# Patient Record
Sex: FEMALE | Race: BLACK OR AFRICAN AMERICAN | Employment: STUDENT | URBAN - METROPOLITAN AREA
[De-identification: names, ages, dates, MRNs, and addresses within clinical notes are randomized per-mention and may not be internally consistent; named-entity substitution may affect disease eponyms.]

---
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
## 2020-01-27 ENCOUNTER — HOSPITAL ENCOUNTER (EMERGENCY)
Facility: CLINIC | Age: 20
Discharge: HOME | End: 2020-01-27
Attending: PSYCHIATRY & NEUROLOGY | Admitting: PSYCHIATRY & NEUROLOGY
Payer: COMMERCIAL

## 2020-01-27 ENCOUNTER — TELEPHONE (OUTPATIENT)
Dept: BEHAVIORAL HEALTH | Facility: CLINIC | Age: 20
End: 2020-01-27

## 2020-01-27 VITALS
OXYGEN SATURATION: 98 % | BODY MASS INDEX: 44.01 KG/M2 | HEART RATE: 75 BPM | DIASTOLIC BLOOD PRESSURE: 78 MMHG | RESPIRATION RATE: 16 BRPM | WEIGHT: 252.4 LBS | TEMPERATURE: 98 F | SYSTOLIC BLOOD PRESSURE: 144 MMHG

## 2020-01-27 DIAGNOSIS — F31.30 BIPOLAR I DISORDER, MOST RECENT EPISODE DEPRESSED (H): ICD-10-CM

## 2020-01-27 PROCEDURE — 25000128 H RX IP 250 OP 636: Performed by: PSYCHIATRY & NEUROLOGY

## 2020-01-27 PROCEDURE — 99284 EMERGENCY DEPT VISIT MOD MDM: CPT | Mod: Z6 | Performed by: PSYCHIATRY & NEUROLOGY

## 2020-01-27 PROCEDURE — 99285 EMERGENCY DEPT VISIT HI MDM: CPT | Mod: 25 | Performed by: PSYCHIATRY & NEUROLOGY

## 2020-01-27 PROCEDURE — 25000132 ZZH RX MED GY IP 250 OP 250 PS 637: Performed by: PSYCHIATRY & NEUROLOGY

## 2020-01-27 PROCEDURE — 90791 PSYCH DIAGNOSTIC EVALUATION: CPT

## 2020-01-27 RX ORDER — LURASIDONE HYDROCHLORIDE 40 MG/1
40 TABLET, FILM COATED ORAL
Qty: 30 TABLET | Refills: 1 | Status: SHIPPED | OUTPATIENT
Start: 2020-01-27 | End: 2020-02-06

## 2020-01-27 RX ORDER — ONDANSETRON 4 MG/1
4 TABLET, ORALLY DISINTEGRATING ORAL ONCE
Status: COMPLETED | OUTPATIENT
Start: 2020-01-27 | End: 2020-01-27

## 2020-01-27 RX ADMIN — ACETAMINOPHINE, CAFFEINE 2 TABLET: 500; 65 TABLET, FILM COATED ORAL at 20:28

## 2020-01-27 RX ADMIN — ONDANSETRON 4 MG: 4 TABLET, ORALLY DISINTEGRATING ORAL at 20:06

## 2020-01-27 ASSESSMENT — ENCOUNTER SYMPTOMS
ABDOMINAL PAIN: 0
DYSPHORIC MOOD: 1
HEADACHES: 1
BACK PAIN: 0
SLEEP DISTURBANCE: 1
SHORTNESS OF BREATH: 0
HALLUCINATIONS: 0
CHEST TIGHTNESS: 0
FEVER: 0
DIZZINESS: 0

## 2020-01-27 NOTE — ED AVS SNAPSHOT
[facility], [place], Emergency Department  [address]  [facility] [address]  Phone:  [phone]  Fax:  [phone]                                    Nathaly Rodriguez   MRN: [id number]    Department:  [facility], Emergency Department   Date of Visit:  1/27/2020           After Visit Summary Signature Page    I have received my discharge instructions, and my questions have been answered. I have discussed any challenges I see with this plan with the nurse or doctor.    ..........................................................................................................................................  Patient/Patient Representative Signature      ..........................................................................................................................................  Patient Representative Print Name and Relationship to Patient    ..................................................               ................................................  Date                                   Time    ..........................................................................................................................................  Reviewed by Signature/Title    ...................................................              ..............................................  Date                                               Time          22EPIC Rev 08/18

## 2020-01-28 NOTE — TELEPHONE ENCOUNTER
S: Alka ACEVEDO called to provide collateral info on a 14 yr old female that is on route to [place] ED.   B: Pt called [facility] today because she is dealing with a lot of things.  She feels very depressed for awhile.  Pt is having a hard time sleeping, having low energy, feels overwhelmed and hopeless.  There is no active SI.  Pt has never attempted to harm self in the past.  Pt takes her prescribed medications on a regular basis; she is very cautious when she takes her meds.  Pt cried for 45 minutes before she was able to meet with the Psych.  Her regular therapist was out of the office this week.   A: On route to ED.  R: To be assessed and medically cleared in the ED.

## 2020-01-28 NOTE — ED PROVIDER NOTES
History     Chief Complaint   Patient presents with     Mental Health Problem     depressed and manic episodes, medications prescribed by her PMD are not helping her. Called [facility] today.      The history is provided by the patient and medical records.     Nathaly Rodriguez is a 19 year old female who comes in due to her worsening depression.  She has recently been diagnosed with bipolar disorder due to her hypomanic reaction to zoloft in the fall. She has been on latuda for a month at 20 mg. She thinks at times it may be working but mostly not.  She has been crying more, struggling to get out of bed, sleeping a lot and feeling very down.  She sometimes thinks she is going to die but has no suicidal or homicidal ideation.  She is going to school at [employer] and likes it.  She was working but quit due to the workload getting too big after several others quit before her.  She called [facility] today and they sent her in for a medication adjustment and possible other resources.     Please see the 's assessment in EPIC from today (1/27/20) for further details.    I have reviewed the Medications, Allergies, Past Medical and Surgical History, and Social History in the Epic system.    Review of Systems   Constitutional: Negative for fever.   Eyes: Negative for visual disturbance.   Respiratory: Negative for chest tightness and shortness of breath.    Cardiovascular: Negative for chest pain.   Gastrointestinal: Negative for abdominal pain.   Musculoskeletal: Negative for back pain.   Neurological: Positive for headaches. Negative for dizziness.   Psychiatric/Behavioral: Positive for dysphoric mood and sleep disturbance. Negative for hallucinations, self-injury and suicidal ideas.   All other systems reviewed and are negative.      Physical Exam   BP: (!) 144/78  Pulse: 75  Temp: 98  F (36.7  C)  Resp: 16  Weight: 114.5 kg (252 lb 6.4 oz)  SpO2: 98 %      Physical Exam  Vitals signs and nursing note reviewed.    Constitutional:       Appearance: Normal appearance. She is well-developed.   Cardiovascular:      Rate and Rhythm: Normal rate and regular rhythm.      Heart sounds: Normal heart sounds.   Pulmonary:      Effort: Pulmonary effort is normal. No respiratory distress.      Breath sounds: Normal breath sounds.   Neurological:      Mental Status: She is alert and oriented to person, place, and time.   Psychiatric:         Attention and Perception: Attention and perception normal.         Mood and Affect: Affect normal. Mood is depressed.         Speech: Speech normal.         Behavior: Behavior is withdrawn. Behavior is cooperative.         Thought Content: Thought content normal. Thought content is not paranoid or delusional. Thought content does not include homicidal or suicidal ideation. Thought content does not include homicidal or suicidal plan.         Cognition and Memory: Cognition and memory normal.         Judgment: Judgment normal.      Comments: Nathaly is a 18 y/o female who looks her age.  She is well groomed with good eye contact.          ED Course        Procedures               Labs Ordered and Resulted from Time of ED Arrival Up to the Time of Departure from the ED - No data to display         Assessments & Plan (with Medical Decision Making)   Nathaly will be discharged home.  She is not an imminent risk to herself or others.  She will increase her latuda to 40 mg.  She will follow up with her therapist.  She will be referred to [facility] treatment program and has an intake appointment on 1/29/20.  She also was set up with a mental health medication provider on 2/4/20.  Referrals were made to a few companies who arrange [facility] workers as well for further assistance.  Nathaly understands the plan and agrees.    I have reviewed the nursing notes.    I have reviewed the findings, diagnosis, plan and need for follow up with the patient.    New Prescriptions    No medications on file       Final diagnoses:    Bipolar I disorder, most recent episode depressed (H)       1/27/2020   [facility], [place], EMERGENCY DEPARTMENT     Bairon Chaparro MD  01/27/20 1954

## 2020-01-28 NOTE — DISCHARGE INSTRUCTIONS
Increase latuda to 40 mg once a day to help with your mood    Go to the intake for day treatment at [facility] scheduled for Wed 1/29/20 at 1 pm    Follow up with your individual therapist    Go to a new medication provider set up for Tuesday 2/4/20 at 3 pm    A referral to get an [facility] worker was made.  They will call you to help arrange this.

## 2020-02-05 ENCOUNTER — TELEPHONE (OUTPATIENT)
Dept: PEDIATRICS | Facility: CLINIC | Age: 20
End: 2020-02-05

## 2020-02-05 DIAGNOSIS — F31.30 BIPOLAR I DISORDER, MOST RECENT EPISODE DEPRESSED (H): ICD-10-CM

## 2020-02-05 NOTE — TELEPHONE ENCOUNTER
Reason for Call:  Other call back    Detailed comments: Latuda prescription issues.  Went to [facility] last week, provider gave her a paper to increase to 40.  Patient does not know doctor's name, nor does she have the paper.  She is running out and would like to get the prescription increased by Dr. Campbell.    Phone Number Patient can be reached at: Cell number on file:    Telephone Information:   Mobile [phone]       Best Time: any time    Can we leave a detailed message on this number? YES    Call taken on 2/5/2020 at 4:49 PM by Yahaira Rene

## 2020-02-06 RX ORDER — LURASIDONE HYDROCHLORIDE 40 MG/1
40 TABLET, FILM COATED ORAL
Qty: 30 TABLET | Refills: 1 | Status: SHIPPED | OUTPATIENT
Start: 2020-02-06 | End: 2020-02-19

## 2020-02-06 NOTE — TELEPHONE ENCOUNTER
Pharmacy pending.  Pt is doing good. She see's her therapist at [facility] every week in [place].  And still needs to call and schedule appts for:  --[facility]. Day treatment program. Missed appt that was scheduled on 1/29, so she needs to call and re-schedule that.  --[facility], to see psychiatry.

## 2020-02-06 NOTE — TELEPHONE ENCOUNTER
"\"Nathaly will be discharged home.  She is not an imminent risk to herself or others.  She will increase her latuda to 40 mg.  She will follow up with her therapist.  She will be referred to [facility] day treatment program and has an intake appointment on 1/29/20.  She also was set up with a mental health medication provider on 2/4/20.  Referrals were made to a few companies who arrange [facility] workers as well for further assistance.  Nathaly understands the plan and agrees.\" per ED notes.   I will refill, but please specify pharmacy    Muna Campbell MD on 2/6/2020 at 9:44 AM    "

## 2020-02-19 ENCOUNTER — OFFICE VISIT (OUTPATIENT)
Dept: PEDIATRICS | Facility: CLINIC | Age: 20
End: 2020-02-19
Payer: COMMERCIAL

## 2020-02-19 ENCOUNTER — TELEPHONE (OUTPATIENT)
Dept: PEDIATRICS | Facility: CLINIC | Age: 20
End: 2020-02-19

## 2020-02-19 ENCOUNTER — NURSE TRIAGE (OUTPATIENT)
Dept: PEDIATRICS | Facility: CLINIC | Age: 20
End: 2020-02-19

## 2020-02-19 VITALS
OXYGEN SATURATION: 100 % | SYSTOLIC BLOOD PRESSURE: 131 MMHG | TEMPERATURE: 98.1 F | HEART RATE: 92 BPM | RESPIRATION RATE: 16 BRPM | BODY MASS INDEX: 42.53 KG/M2 | WEIGHT: 243.9 LBS | DIASTOLIC BLOOD PRESSURE: 87 MMHG

## 2020-02-19 DIAGNOSIS — F31.30 BIPOLAR I DISORDER, MOST RECENT EPISODE DEPRESSED (H): Primary | ICD-10-CM

## 2020-02-19 DIAGNOSIS — G47.00 INSOMNIA, UNSPECIFIED TYPE: ICD-10-CM

## 2020-02-19 DIAGNOSIS — F30.8 HYPOMANIA (H): ICD-10-CM

## 2020-02-19 DIAGNOSIS — S66.912A STRAIN OF LEFT WRIST, INITIAL ENCOUNTER: ICD-10-CM

## 2020-02-19 PROCEDURE — 99215 OFFICE O/P EST HI 40 MIN: CPT | Performed by: PEDIATRICS

## 2020-02-19 PROCEDURE — 96127 BRIEF EMOTIONAL/BEHAV ASSMT: CPT | Performed by: PEDIATRICS

## 2020-02-19 RX ORDER — LURASIDONE HYDROCHLORIDE 20 MG/1
20 TABLET, FILM COATED ORAL
Qty: 30 TABLET | Refills: 0 | Status: SHIPPED | OUTPATIENT
Start: 2020-02-19 | End: 2020-02-20

## 2020-02-19 ASSESSMENT — ANXIETY QUESTIONNAIRES
5. BEING SO RESTLESS THAT IT IS HARD TO SIT STILL: NEARLY EVERY DAY
GAD7 TOTAL SCORE: 20
IF YOU CHECKED OFF ANY PROBLEMS ON THIS QUESTIONNAIRE, HOW DIFFICULT HAVE THESE PROBLEMS MADE IT FOR YOU TO DO YOUR WORK, TAKE CARE OF THINGS AT HOME, OR GET ALONG WITH OTHER PEOPLE: EXTREMELY DIFFICULT
3. WORRYING TOO MUCH ABOUT DIFFERENT THINGS: NEARLY EVERY DAY
1. FEELING NERVOUS, ANXIOUS, OR ON EDGE: NEARLY EVERY DAY
6. BECOMING EASILY ANNOYED OR IRRITABLE: MORE THAN HALF THE DAYS
2. NOT BEING ABLE TO STOP OR CONTROL WORRYING: NEARLY EVERY DAY
7. FEELING AFRAID AS IF SOMETHING AWFUL MIGHT HAPPEN: NEARLY EVERY DAY

## 2020-02-19 ASSESSMENT — PATIENT HEALTH QUESTIONNAIRE - PHQ9
SUM OF ALL RESPONSES TO PHQ QUESTIONS 1-9: 26
5. POOR APPETITE OR OVEREATING: NEARLY EVERY DAY

## 2020-02-19 NOTE — TELEPHONE ENCOUNTER
lurasidone (LATUDA) 40 MG TABS tablet--Pt called to tell Dr. Campbell that she is thinking about death and dying. Since increase in medication strength, she feels like she is going thru withdrawal--spiraling thought and tingling lasting for hours.     Please call pt [phone], ok to lv detailed message

## 2020-02-19 NOTE — TELEPHONE ENCOUNTER
Pt did not take medication today and is feeling ok now.  No thoughts of hurting self.  Made appt to be seen today with partner.  Advised to go to ER if anything changes.

## 2020-02-19 NOTE — TELEPHONE ENCOUNTER
LM on VM to call back.     Also left Crisis Line number to call as well as 911 if she is in immediate danger.     Will try again.    Isaura GERBERN, RN, PHN

## 2020-02-19 NOTE — PROGRESS NOTES
"Subjective    Nathaly Rodriguez is a 19 year old female who presents to clinic today with by herself because of:  Anxiety and Depression     HPI   Mental Health Follow-up Visit for Anxiety and depression    How is your mood today? Anxious and sad    Change in symptoms since last visit: worse    New symptoms since last visit:  Feeling restless and hard time calming her nerves    Problems taking medications: she feels the Latuda is not working.    Who else is on your mental health care team? Therapist and Primary Care Provider    +++++++++++++++++++++++++++++++++++++++++++++++++++++++++++++++    PHQ 10/22/2015 8/23/2019 2/19/2020   PHQ-9 Total Score 0 20 26   Q9: Thoughts of better off dead/self-harm past 2 weeks Not at all Not at all Nearly every day     BRITTA-7 SCORE 8/23/2019 2/19/2020   Total Score 20 (severe anxiety) -   Total Score 20 20     Nathaly has had mental health difficulties since graduating high school and starting college.  She was initially treated with Zoloft, but had a manic episode at work 3 months ago so that was discontinued.  She had been treated with Latuda.     Of note, she also has ADHD and takes Adderall  XR.  She also uses Atarax for anxiety    Initially, the 20 mg of Latuda seemed to help her.  After 3-4 weeks, she was again having \"spiraling thoughts of death\" and thinking of the works possible scenario of every situation.  She was concerned and called [facility].    They instructed her to go to the ED.  She was sen in the ED on 1/27/2020, deemed safe for discharge, and her Latuda was increased to 40 mg daily.      The increase in the medication has made her feel worse.  She took it in the morning, and around 2:30 pm felt the need to lay down for a couple of hours every day.  Then this need to lay down increased to all day long, until the medication wears off.  She feels like she cannot eat on the medication and has lost weight.  She has this \"shaking/intense tingle and overwhelming thoughts\" with it.  " She feels almost physically ill.  She cannot go to school.  She cannot go to work.     Today she did not take her medication.    Of note, she was smoking marijuna in the past but has stopped.  She tried CBD cigarettes but did not like them and has stopped.  Denies any other substance use.    A few months ago she woke up in the middle of the night and felt the need to clean her home, despite the fact that it was clean.  She dragged some kind of heavy box across a long hallway, and then it feell and hyperextended her wrist, which still hiurts.    Nathaly's birth parents both have been diagnosed with bipolar disorder.  She had been in foster care since the age of 10, and is having some trouble transitioning to adult living.  She does not feel like she has any adults in her life that can serve as a resource on how to structure her life and take care of herself.  She misses meals and does not sleep regularly.    Home and School     Have there been any big changes at home? Yes-  A few months ago she finished high school and started college    Are you having challenges at school?   No  Social Supports:     none.  Sleep:    Hours of sleep on a school night: </=7 hours (associated with increased risk of depression within 12 months)    Maladaptive coping strategies:    None desribed      Suicide Assessment Five-step Evaluation and Treatment (SAFE-T)        Review of Systems  Constitutional, eye, ENT, skin, respiratory, cardiac, and GI are normal except as otherwise noted.    Problem List  Patient Active Problem List    Diagnosis Date Noted     Bipolar I disorder, most recent episode depressed (H) 12/06/2019     Priority: Medium     Moderate episode of recurrent major depressive disorder (H) 08/23/2019     Priority: Medium     Eczema 02/20/2015     Priority: Medium     Attention deficit hyperactivity disorder (ADHD) 05/15/2014     Priority: Medium     Problem list name updated by automated process. Provider to review        Depression 05/15/2014     Priority: Medium     Seasonal allergic rhinitis 05/15/2014     Priority: Medium     Obesity 06/03/2013     Priority: Medium     Environmental allergies 06/03/2013     Priority: Medium      Medications  amphetamine-dextroamphetamine (ADDERALL XR) 10 MG 24 hr capsule, Take 1 capsule (10 mg) by mouth daily  amphetamine-dextroamphetamine (ADDERALL XR) 20 MG 24 hr capsule, Take 1 capsule (20 mg) by mouth daily  hydrOXYzine (ATARAX) 25 MG tablet, Take 1 tablet (25 mg) by mouth every 6 hours as needed for anxiety May take 50 mg at bedtime  loratadine (CLARITIN) 10 MG tablet, Take 10 mg by mouth    No current facility-administered medications on file prior to visit.     Allergies  Allergies   Allergen Reactions     Seasonal Allergies      Food Rash     Strawberries, Kiwi, Oranges, Pickles, Ronald & Limes, Tomatoes      Reviewed and updated as needed this visit by Provider  Meds  Problems           Objective    /87   Pulse 92   Temp 98.1  F (36.7  C) (Oral)   Resp 16   Wt 243 lb 14.4 oz (110.6 kg)   SpO2 100%   BMI 42.53 kg/m    >99 %ile based on CDC (Girls, 2-20 Years) weight-for-age data based on Weight recorded on 2/19/2020.    Physical Exam            Assessment & Plan    1. Bipolar I disorder, most recent episode depressed (H)  2. Hypomania (H)  Mixed manic and depressive episode  - lurasidone (LATUDA) 20 MG PO TABS tablet; Take 1 tablet (20 mg) by mouth daily with food  Dispense: 30 tablet; Refill: 0 - I asked her to decrease her dose to 20 mg for now.  - MENTAL HEALTH REFERRAL  - Adult; Psychiatry and Medication Management; Psychiatry; [facility] [facility] [phone].  Medication management & future refills will be returned to [facility] PCP upon completion of evaluation; We juanita...      After our visit I spoke with Dr. Seth Avalos of [facility] advice line.  He presents two management options.    1) [facility] intensive outpatient program.  We need to discuss  this option with the patient and ONLY if she is agreeable then call [facility] on the doctor referral line at [phone] to get her a place with Dr. Avalos's verbal referral.    2) Manage her ourselves:   - stop Adderall   - stop Latuda  - start Lithium 300 mg po bid.       Before starting it, get baseline kidney functions, thyroid levels, and an EKG     After 5 days on this, check a trough (morning before taking the morning dose) level - it should be around 0.6.  If that is the case then change dosing to 300mg qam and 600 mg pm.  Check an occasional trough level, it should be 0.9-1.  No need to check frequently unless she is having side effects - shakiness, diarrhea, other signs of toxicity.  - also, start Seroquel at bedtime at 50 mg po at bedtime.  This should help her sleep which will also help the manic symptoms.  May increase dose as needed unless sleep is adequate.    Will discuss plan with PCP and then reach out to patient.        3. Strain of left wrist, initial encounter  Patient reassured      Follow Up  Return in about 1 week (around 2/26/2020) for recheck, if not improving.      Daniela Cantu MD

## 2020-02-19 NOTE — TELEPHONE ENCOUNTER
Left message RE: Dr. Cantu's recommendations in today's note.   If she calls back Thursday or Friday please come get me out of a patient room so I can relay this information to her directly    Muna Campbell MD on 2/19/2020 at 4:13 PM  .

## 2020-02-20 ENCOUNTER — TELEPHONE (OUTPATIENT)
Dept: PEDIATRICS | Facility: CLINIC | Age: 20
End: 2020-02-20

## 2020-02-20 RX ORDER — QUETIAPINE FUMARATE 50 MG/1
50 TABLET, FILM COATED ORAL AT BEDTIME
Qty: 30 TABLET | Refills: 1 | Status: SHIPPED | OUTPATIENT
Start: 2020-02-20 | End: 2020-04-20

## 2020-02-20 ASSESSMENT — ANXIETY QUESTIONNAIRES: GAD7 TOTAL SCORE: 20

## 2020-02-20 NOTE — TELEPHONE ENCOUNTER
Pt advised, stated understanding, and agreed to plan of care.    Information printed. And faxed to [facility], [phone].

## 2020-02-20 NOTE — TELEPHONE ENCOUNTER
Reason for Call:  Other call back    Detailed comments: patient is calling back to get information regarding her treatment plan    Phone Number Patient can be reached at: Cell number on file:    Telephone Information:   Mobile [phone]       Best Time: Anytime    Can we leave a detailed message on this number? YES    Call taken on 2/20/2020 at 9:30 AM by Ortzi Gannon

## 2020-02-20 NOTE — TELEPHONE ENCOUNTER
1) [facility] intensive outpatient program. PATIENT IS AGREEABLE TO THIS OPTION .  [facility] the doctor referral line at [phone] to get her a place - Please fax them the last few clinic notes (printed) and a facesheet with contact and insurance infomartion with a mention that this is with Dr. Avalos's verbal referral as well.     Please let patient know when this has been done and let her know that [facility] will contact her either Friday or Monday with intake information. They will take care of sending any notes necessary to her school to excuse her from classes if necessary while she is getting intensive treatment. Patient advised it may take several weeks of this for her to get on the right track     2) patient was advised to:    - stop Adderall for now   - stop Latuda       - also, start Seroquel at bedtime at 50 mg po at bedtime (rx sent to pharmacy) .  This should help her sleep which will also help the manic symptoms.        Muna Campbell MD on 2/20/2020 at 1:03 PM

## 2020-02-27 ENCOUNTER — TELEPHONE (OUTPATIENT)
Dept: PEDIATRICS | Facility: CLINIC | Age: 20
End: 2020-02-27

## 2020-02-27 NOTE — TELEPHONE ENCOUNTER
Reason for Call:  Other call back    Detailed comments: Patient states she was told that [facility] would be calling her the 20th or 21st.  Patient has not heard from [facility] yet and would like the doctor to know this and would like to know what step she is to take next.    Phone Number Patient can be reached at: Cell number on file:    Telephone Information:   Mobile [phone]       Best Time: any time    Can we leave a detailed message on this number? Not known -- patient did not stay on line.    Call taken on 2/27/2020 at 2:53 PM by Yahaira Rene

## 2020-04-20 ENCOUNTER — TELEPHONE (OUTPATIENT)
Dept: PEDIATRICS | Facility: CLINIC | Age: 20
End: 2020-04-20

## 2020-04-20 ENCOUNTER — VIRTUAL VISIT (OUTPATIENT)
Dept: PEDIATRICS | Facility: CLINIC | Age: 20
End: 2020-04-20
Payer: COMMERCIAL

## 2020-04-20 DIAGNOSIS — F31.30 BIPOLAR I DISORDER, MOST RECENT EPISODE DEPRESSED (H): Primary | ICD-10-CM

## 2020-04-20 DIAGNOSIS — F31.30 BIPOLAR I DISORDER, MOST RECENT EPISODE DEPRESSED (H): ICD-10-CM

## 2020-04-20 DIAGNOSIS — G47.00 INSOMNIA, UNSPECIFIED TYPE: ICD-10-CM

## 2020-04-20 DIAGNOSIS — F30.8 HYPOMANIA (H): ICD-10-CM

## 2020-04-20 PROCEDURE — 99442 ZZC PHYSICIAN TELEPHONE EVALUATION 11-20 MIN: CPT | Performed by: PEDIATRICS

## 2020-04-20 RX ORDER — QUETIAPINE FUMARATE 50 MG/1
50 TABLET, FILM COATED ORAL AT BEDTIME
Qty: 30 TABLET | Refills: 1 | Status: SHIPPED | OUTPATIENT
Start: 2020-04-20 | End: 2020-06-16

## 2020-04-20 NOTE — NURSING NOTE
XIOMY emailed to pt at [email or web address] Ann Maloney on 4/20/2020 at 4:39 PM

## 2020-04-20 NOTE — TELEPHONE ENCOUNTER
Please call patient in 1 week to see how she is doing on her seroquel. Sleep improved? Side effects?   Discussed may need to increase dose and/or start on lithium if cannot get in with psychiatry promptly (encouraged  To do e-visit with them if cannot get in person appointment right now due to COVID). Has she contacted them yet?   Also placed care coordination encounter to assist/help make sure she gets follow-up regularly.   She is engaging in counseling per our last phone appointment    Muna Campbell MD on 4/20/2020 at 4:08 PM

## 2020-04-20 NOTE — PROGRESS NOTES
"Nathaly Rodriguez is a 20 year old female who is being evaluated via a billable telephone visit.      The patient has been notified of following:     \"This telephone visit will be conducted via a call between you and your physician/provider. We have found that certain health care needs can be provided without the need for a physical exam.  This service lets us provide the care you need with a short phone conversation.  If a prescription is necessary we can send it directly to your pharmacy.  If lab work is needed we can place an order for that and you can then stop by our lab to have the test done at a later time.    Telephone visits are billed at different rates depending on your insurance coverage. During this emergency period, for some insurers they may be billed the same as an in-person visit.  Please reach out to your insurance provider with any questions.    If during the course of the call the physician/provider feels a telephone visit is not appropriate, you will not be charged for this service.\"    Patient has given verbal consent for Telephone visit?  Yes    How would you like to obtain your AVS? E-Mail (inform patient AVS not encrypted)    Subjective     Nathaly Rodriguez is a 20 year old female who presents to clinic today for the following health issues:    Concerns: Pt is calling to discuss medications that were prescribed. The two medications were latuda and hydroxyzine however, pt had some reactions to that and [facility] told her to stop all medications (she has not seen psych yet). Pt was also told to stop adderall as well. So pt is wanting to discuss depression/anxiety, ADHD, and to help with sleep. There was mention of lithium?     The original plan was for patient to do day treatment at [facility] unfortunately we had her all set up and then she found out that there was a co-pay and insurance did not cover 100% she could not afford the co-pay and so did not proceed with that treatment  Has been off " all medications since February  Her grades have really taint she is completely unable to sleep  Denies any active suicidal ideation  May need a letter of appeal for school  Never picked up the Seroquel forgot all about it  Would like to try again to get some help    Patient Active Problem List   Diagnosis     Obesity     Environmental allergies     Attention deficit hyperactivity disorder (ADHD)     Depression     Seasonal allergic rhinitis     Eczema     Moderate episode of recurrent major depressive disorder (H)     Bipolar I disorder, most recent episode depressed (H)     History reviewed. No pertinent surgical history.    Social History     Tobacco Use     Smoking status: Never Smoker     Smokeless tobacco: Never Used   Substance Use Topics     Alcohol use: No     Alcohol/week: 0.0 standard drinks     Comment:   9/20/2013 11/01/2013 2/20/15     Family History   Problem Relation Age of Onset     Asthma Other         cousin     Cancer Maternal Grandmother         Breast cancer      Psychotic Disorder Mother         Depression      Asthma Other         cousin     C.A.D. No family hx of      Hypertension No family hx of      Lipids No family hx of      Cerebrovascular Disease No family hx of      Diabetes No family hx of      Cardiovascular No family hx of      Congenital Anomalies No family hx of      Blood Disease No family hx of      Endocrine Disease No family hx of      Gastrointestinal Disease No family hx of      Genitourinary Problems No family hx of      Respiratory No family hx of      Neurologic Disorder No family hx of          Current Outpatient Medications   Medication Sig Dispense Refill     QUEtiapine (SEROQUEL) 50 MG tablet Take 1 tablet (50 mg) by mouth At Bedtime 30 tablet 1     hydrOXYzine (ATARAX) 25 MG tablet Take 1 tablet (25 mg) by mouth every 6 hours as needed for anxiety May take 50 mg at bedtime (Patient not taking: Reported on 4/20/2020) 60 tablet 3     loratadine (CLARITIN) 10 MG  tablet Take 10 mg by mouth       Allergies   Allergen Reactions     Seasonal Allergies      Food Rash     Strawberries, Kiwi, Oranges, Pickles, Ronald & Limes, Tomatoes        Reviewed and updated as needed this visit by Provider  Tobacco  Allergies  Meds  Problems  Med Hx  Surg Hx  Fam Hx         Review of Systems   ROS COMP: GENERAL: No fever, weight change, fatigue  SKIN: No rash, hives, or significant lesions  HEENT: Hearing/vision: No Eye redness/discharge, nasal congestion, sneezing, snoring  RESP: No cough, wheezing, SOB  CV: No cyanosis, palpitations, syncope, chest pain  GI: No constipation, diarrhea, abdominal pain  Neuro: No headaches, tics, migraines, tremor  PSYCH: + history of depression or ODD, suicide attempts, cutting         Objective   Reported vitals:  There were no vitals taken for this visit.   healthy, alert and no distress  PSYCH: Alert and oriented times 3; coherent speech, normal   rate and volume, able to articulate logical thoughts, able   to abstract reason, no tangential thoughts, no hallucinations   or delusions  Her affect is normal does not sound as manic today as she did at the last visit  RESP: No cough, no audible wheezing, able to talk in full sentences  Remainder of exam unable to be completed due to telephone visits          Assessment/Plan:    ICD-10-CM    1. Bipolar I disorder, most recent episode depressed (H)  F31.30 QUEtiapine (SEROQUEL) 50 MG tablet     MENTAL HEALTH REFERRAL  - Adult; Psychiatry; Psychiatry; [facility]: Psychiatry Clinic [phone]; We will contact you to schedule the appointment or please call with any questions   2. Hypomania (H)  F30.8 QUEtiapine (SEROQUEL) 50 MG tablet     MENTAL HEALTH REFERRAL  - Adult; Psychiatry; Psychiatry; [facility]: Psychiatry Clinic [phone]; We will contact you to schedule the appointment or please call with any questions   3. Insomnia, unspecified type  G47.00 QUEtiapine (SEROQUEL) 50 MG tablet     MENTAL HEALTH  REFERRAL  - Adult; Psychiatry; Psychiatry; [facility]: Psychiatry Clinic [phone]; We will contact you to schedule the appointment or please call with any questions     Will re-leann to [facility] psychiatry as unable to get help through [facility]  F/u phone call in 1 week  May increase seroquel and may even need to start lithium- would need baseline labs, EKG before initiating Rx, talking with patient about this today and will reassess next week    Return in about 1 week (around 4/27/2020) for Depression/Anxiety follow-up.    Phone call duration:  15 minutes    Muna Campbell MD

## 2020-04-21 ENCOUNTER — PATIENT OUTREACH (OUTPATIENT)
Dept: CARE COORDINATION | Facility: CLINIC | Age: 20
End: 2020-04-21

## 2020-04-21 SDOH — ECONOMIC STABILITY: TRANSPORTATION INSECURITY
IN THE PAST 12 MONTHS, HAS THE LACK OF TRANSPORTATION KEPT YOU FROM MEDICAL APPOINTMENTS OR FROM GETTING MEDICATIONS?: NO

## 2020-04-21 SDOH — ECONOMIC STABILITY: INCOME INSECURITY: HOW HARD IS IT FOR YOU TO PAY FOR THE VERY BASICS LIKE FOOD, HOUSING, MEDICAL CARE, AND HEATING?: SOMEWHAT HARD

## 2020-04-21 SDOH — ECONOMIC STABILITY: FOOD INSECURITY: WITHIN THE PAST 12 MONTHS, THE FOOD YOU BOUGHT JUST DIDN'T LAST AND YOU DIDN'T HAVE MONEY TO GET MORE.: NEVER TRUE

## 2020-04-21 SDOH — ECONOMIC STABILITY: TRANSPORTATION INSECURITY
IN THE PAST 12 MONTHS, HAS LACK OF TRANSPORTATION KEPT YOU FROM MEETINGS, WORK, OR FROM GETTING THINGS NEEDED FOR DAILY LIVING?: NO

## 2020-04-21 SDOH — HEALTH STABILITY: MENTAL HEALTH
STRESS IS WHEN SOMEONE FEELS TENSE, NERVOUS, ANXIOUS, OR CAN'T SLEEP AT NIGHT BECAUSE THEIR MIND IS TROUBLED. HOW STRESSED ARE YOU?: RATHER MUCH

## 2020-04-21 SDOH — ECONOMIC STABILITY: FOOD INSECURITY: WITHIN THE PAST 12 MONTHS, YOU WORRIED THAT YOUR FOOD WOULD RUN OUT BEFORE YOU GOT MONEY TO BUY MORE.: NEVER TRUE

## 2020-04-21 ASSESSMENT — ACTIVITIES OF DAILY LIVING (ADL): DEPENDENT_IADLS:: INDEPENDENT

## 2020-04-21 NOTE — LETTER
[place] CARE COORDINATION  [facility]  [address] [address]  Tel. [phone]  Fax [phone]    April 21, 2020    Nathaly Zoejamesemily RODRIGUEZ  [facility] [address]      Dear Nathaly,    I am a clinic care coordinator who works with Muna Campbell MD at [facility]. I wanted to thank you for spending the time to talk with me.  Below is a description of clinic care coordination and how I can further assist you.      The clinic care coordination team is made up of a registered nurse,  and community health worker who understand the health care system. The goal of clinic care coordination is to help you manage your health and improve access to the health care system in the most efficient manner. The team can assist you in meeting your health care goals by providing education, coordinating services, strengthening the communication among your providers and supporting you with any resource needs.    Please feel free to contact me at [phone] with any questions or concerns. We are focused on providing you with the highest-quality healthcare experience possible and that all starts with you.     Sincerely,     WILLOW Rivera   Social Work Clinic Care Coordinator   [facility], and [facility]   PH: [phone]  [email or web address]   Enclosed: I have enclosed a copy of the Complex Care Plan. This has helpful information and goals that we have talked about. Please keep this in an easy to access place to use as needed.

## 2020-04-21 NOTE — PROGRESS NOTES
Clinic Care Coordination Contact    Clinic Care Coordination Contact  OUTREACH    Referral Information:  Referral Source: PCP    Primary Diagnosis: Psychosocial    Chief Complaint   Patient presents with     Clinic Care Coordination - Initial        Universal Utilization:   Clinic Utilization  Difficulty keeping appointments: No  Compliance Concerns: No  No-Show Concerns: No  No PCP office visit in Past Year: No  Utilization    Last refreshed: 4/21/2020 12:48 PM:  Hospital Admissions 0           Last refreshed: 4/21/2020 12:48 PM:  ED Visits 1           Last refreshed: 4/21/2020 12:48 PM:  No Show Count (past year) 2              Current as of: 4/21/2020 12:48 PM              Clinical Concerns:  Current Medical Concerns: None reported. Patient is diagnosed with:     Patient Active Problem List   Diagnosis     Obesity     Environmental allergies     Attention deficit hyperactivity disorder (ADHD)     Depression     Seasonal allergic rhinitis     Eczema     Moderate episode of recurrent major depressive disorder (H)     Bipolar I disorder, most recent episode depressed (H)         Current Behavioral Concerns: Patient struggles with anxiety and depression. She has been off all of her medications since February. PCP restarted patient on Seroquel yesterday 4/20/20. Patient accesses outpatient therapist and is looking to start psychiatry services. Her day treatment program at [facility] did not happen as she could not afford the copay/insurance did not cover 100% of the cost.     Education Provided to patient: role of YIFAN NOLASCO and clinic care coordination,  mental health resources, psychiatry referral order     YIFAN CC outreach to patient per PCP order. Discussed psychiatry order and gave phone number in case patient does not hear from them this week - [phone]. YIFAN NOLASCO also suggested patient reach out to  [facility] for  services - [phone].    Patient sees therapist at [facility]. YIFAN  updated care team in chart.     YIFAN  offered additional resources surrounding food, clothing, financials - declined.     Pain  Pain: None reported     Health Maintenance Reviewed: Due/Overdue - discussed with PCP at appointment 4/20/20    Health Maintenance Due   Topic Date Due     PREVENTIVE CARE VISIT  10/21/2017     INFLUENZA VACCINE (1) 09/01/2019       Clinical Pathway: Clinic Care Coordinator - Medications and Treatment  Patient is using individual therapy and medication(s) Seroquel and Atarax to treat depression and anxiety.  Benefits of medication were were not  discussed. Discussed with PCP 4/20/20.  Patient indicates good understanding of medication and need to continue to take, even if feeling better.    Use of caffeine, alcohol, and other mood-altering substances  were not  discussed.    Patient was not Referred to CD treatment.  Benefits of counseling  were  discussed.   Patient is is interested in seeing a counselor   Patient was assisted with mental health resources: psychiatrist, day treatment (if wanted).     Medication Management:  Medication reconciliation status: Medications reviewed and reconciled.  Continue medications without change.    Functional Status:  Dependent ADLs: Independent  Dependent IADLs: Independent  Bed or wheelchair confined: No  Mobility Status: Independent  Fallen 2 or more times in the past year?: No  Any fall with injury in the past year?: No    Living Situation:  Current living arrangement: I live in a private home  Type of residence: Private home with family     Lifestyle & Psychosocial Needs:  Lifestyle     Physical activity     Days per week: Not on file     Minutes per session: Not on file     Stress: Rather much     Social Needs     Financial resource strain: Somewhat hard     Food insecurity     Worry: Never true     Inability: Never true     Transportation needs     Medical: No     Non-medical: No     Diet: Regular  Inadequate nutrition: No  Tube  Feeding: No  Inadequate activity/exercise: No  Significant changes in sleep pattern: Yes  Transportation means: Regular car     Mormon or spiritual beliefs that impact treatment: No  Mental health DX: Yes  Mental health DX how managed: Medication, Outpatient Counseling  Mental health management concern: Yes  Informal Support system: Family     Socioeconomic History     Marital status: Single     Spouse name: Not on file     Number of children: Not on file     Years of education: Not on file     Highest education level: Not on file     Tobacco Use     Smoking status: Never Smoker     Smokeless tobacco: Never Used   Substance and Sexual Activity     Alcohol use: No     Alcohol/week: 0.0 standard drinks     Comment:   9/20/2013 11/01/2013 2/20/15     Drug use: Not Currently     Types: Marijuana     Comment:  9/20/2013 11/01/2013 2/20/15     Sexual activity: Not Currently     Partners: Male     Comment: 10/22/15  10/21/2016 l.n.        Resources and Interventions:  Current Resources:   Community Resources: OP Mental Health, School  Supplies used at home: None  Equipment Currently Used at Home: none    Advance Care Plan/Directive  Advanced Care Plans/Directives on file: No  Advanced Care Plan/Directive Status: Not Applicable    Referrals Placed: Mental Health, Outpatient Services, Behavioral Health Providers     Goals:   Goals        General    1. Mental Health Management (pt-stated)     Notes - Note created  4/21/2020  2:23 PM by Judy Garcia LSW    Goal Statement: I will establish with outpatient mental health providers within three months.   Date Goal set: 4/21/20  Barriers: Financial  Strengths: Motivated, has therapist now  Date to Achieve By: 8/1/20  Patient expressed understanding of goal: Yes    Action steps to achieve this goal:  1. I will continue working with my therapist.  2. I will schedule intake with [facility] mental health services for psychiatry.   3. I will call [facility] line for any  additional services I may need and work with YIFAN NOLASCO for ongoing support.              Patient/Caregiver understanding: Pt reports understanding and denies any additional questions or concerns at this times. YIFAN CC engaged in AIDET communication during encounter.    Outreach Frequency: Monthly    Plan: Patient was provided with this writer's contact information and encouraged to call with any questions or concerns. YIFAN CC will e-mail care coordination introduction letter and complex care plan to patient. Patient will connect with [facility] scheduling for psychiatry intake. Patient will continue to access her outpatient therapist. YIFAN NOLASCO will check in in two weeks to ensure patient scheduled with psychiatry.     WILLOW Rivera   Social Work Clinic Care Coordinator   [facility]   PH: [phone]  [email or web address]

## 2020-04-21 NOTE — LETTER
[facility]  Complex Care Plan  About Me:    Patient Name:  Nathaly Rodriguez    YOB: 2000  Age:         20 year old   [place] MRN:    [id number] Telephone Information:  Home Phone [phone]   Mobile [phone]       Address:  [address]  [facility] [address] Email address:  [email or web address]      Emergency Contact(s)    Name Relationship Lgl Grd Work Phone Home Phone Mobile Phone   1. PRATIBHA RIOJAS* Mother    [phone]   2. ZENY, MANOLO Relative   [phone]            Primary language:  English     needed? No   [facility]:  [phone] op. 1  Other communication barriers: None  Preferred Method of Communication:  John  Current living arrangement: I live in a private home  Mobility Status/ Medical Equipment: Independent    Health Maintenance  Health Maintenance Reviewed: Due/Overdue   Health Maintenance Due   Topic Date Due     PREVENTIVE CARE VISIT  10/21/2017     INFLUENZA VACCINE (1) 09/01/2019     My Access Plan  Medical Emergency 911   Primary Clinic Line [facility] - [phone]   24 Hour Appointment Line [phone] or  [phone] (toll-free)   24 Hour Nurse Line [phone] (toll-free)   Preferred Urgent Care [facility], [phone]   Preferred Hospital [facility]  [phone]   Preferred Pharmacy [id number] [address] AT [facility]     Behavioral Health Crisis Line The National Suicide Prevention Lifeline at 1-957.178.8029 or 911       My Care Team Members  Patient Care Team       Relationship Specialty Notifications Start End    Muna Campbell MD PCP - General Pediatrics All results, Admissions 3/6/14     Phone: [phone] Fax: [phone]         [address] [facility] [address]    Muna Campbell MD Assigned PCP    11/3/19     Phone: [phone] Fax: [phone]         [address] [place] [address]    Judy Garcia LSW Clinic Care Coordinator Primary Care - CC  4/21/20             My Care Plans  Self Management and Treatment Plan  Goals and (Comments)  Goals        General    1. Mental Health Management (pt-stated)     Notes - Note created  4/21/2020  2:23 PM by Judy Garcia LSW    Goal Statement: I will establish with outpatient mental health providers within three months.   Date Goal set: 4/21/20  Barriers: Financial  Strengths: Motivated, has therapist now  Date to Achieve By: 8/1/20  Patient expressed understanding of goal: Yes    Action steps to achieve this goal:  1. I will continue working with my therapist.  2. I will schedule intake with  mental health services for psychiatry.   3. I will call [facility]  line for any additional services I may need and work with [facility] for ongoing support.               Action Plans on File:                       Advance Care Plans/Directives Type:        My Medical and Care Information  Problem List   Patient Active Problem List   Diagnosis     Obesity     Environmental allergies     Attention deficit hyperactivity disorder (ADHD)     Depression     Seasonal allergic rhinitis     Eczema     Moderate episode of recurrent major depressive disorder (H)     Bipolar I disorder, most recent episode depressed (H)      Current Medications and Allergies:    Current Outpatient Medications   Medication     hydrOXYzine (ATARAX) 25 MG tablet     loratadine (CLARITIN) 10 MG tablet     QUEtiapine (SEROQUEL) 50 MG tablet     No current facility-administered medications for this visit.      Care Coordination Start Date: No linked episodes   Frequency of Care Coordination: monthly   Form Last Updated: 04/21/2020

## 2020-04-21 NOTE — PROGRESS NOTES
Clinic Care Coordination Contact  [facility]/Voicemail    Referral Source: PCP     Clinical Data: Care Coordinator Outreach  Mental health support     Outreach attempted x 1.  Left message on patient's voicemail with call back information and requested return call.    Plan: Care Coordinator will try to reach patient again in 1-2 business days.    WILLOW Rivera   Social Work Clinic Care Coordinator   [facility] and [facility]   PH: [phone]  [email or web address]

## 2020-04-27 NOTE — TELEPHONE ENCOUNTER
JUAN JOSE--Dr. Campbell,     Pt update.  Sleeping better on the seroquel 50mg before bed.  Also, taking hydroxyzone 25mg every morning for anxiety.    No appt with psychiatrty. Gave her phone # to call and schedule appt.  P: Psychiatry Clinic [phone]     Seeing counseling every Tues & Thurs. Tuesday's Virtual counseling via [facility] counselor.   Thursday's: [facility], [address] [place] [address].  Counseling & Mental Health  Phone: [phone]

## 2020-05-05 ENCOUNTER — PATIENT OUTREACH (OUTPATIENT)
Dept: CARE COORDINATION | Facility: CLINIC | Age: 20
End: 2020-05-05

## 2020-05-05 NOTE — PROGRESS NOTES
Clinic Care Coordination Contact  [facility]/Voicemail    Clinical Data: Care Coordinator Outreach  Check in to ensure psychiatry appt made/medication check in     Outreach attempted x 1.  Left message on patient's voicemail with call back information and requested return call.    Chart review indicates nurse called patient on 4/27/20 for an update. Patient reported she is sleeping better on Seroquel 50 mg before bed. Patient is also taking hydroxyzine 25 mg every morning for anxiety. Patient had not made psychiatry appt at that time, was given contact information for [facility] again. ([phone]). Confirmed she was seeing her counselor via virtual visits.     Plan: Care Coordinator will try to reach patient again in 10 business days.    WILLWO Rivera   Social Work Clinic Care Coordinator   [facility]   PH: [phone]  [email or web address]

## 2020-05-13 ENCOUNTER — TELEPHONE (OUTPATIENT)
Dept: PSYCHIATRY | Facility: CLINIC | Age: 20
End: 2020-05-13

## 2020-05-13 NOTE — TELEPHONE ENCOUNTER
PSYCHIATRY CLINIC PHONE INTAKE     SERVICES REQUESTED / INTERESTED IN          Med Management    Presenting Problem and Brief History                              What would you like to be seen for? (brief description):  Pt has concerns about insomnia. Her mind races and she can't sleep even if she's tired. Sometimes she gets really sad, it can be about a thought from the past, or high stressed environments. She freezes in those moments. It feels like anxiety. She's been trying to get her license but her anxiety gets so high that she can't complete her test. She takes quetiapine 50mg, hydroxyzine 25mg, and adderall 20mg and 10mg. Pt's meds are being prescribed by PCP. Her PCP wants her medications reviewed to make sure she's on the right medications. Pt wants to get an eval first and see what recommendations she gets.     Have you received a mental health diagnosis? No   Which one (s): NA  Is there any history of developmental delay?  No   Are you currently seeing a mental health provider?  Yes            Who / month last seen:  Monae Abdalla, Therapist at [facility] in [place].   Do you have mental health records elsewhere?  Yes  Will you sign a release so we can obtain them?  Yes    Have you ever been hospitalized for psychiatric reasons?  Yes  Describe:  When she was 14 she was hospitalized for 2 weeks.     Do you have current thoughts of self-harm?  No    Do you currently have thoughts of harming others?  No       Substance Use History     Do you have any history of alcohol / illicit drug use?  No  Describe:  NA  Have you ever received treatment for this?  No    Describe:  NA     Social History     Who is the patient's a guardian?  No    Name / number: NA  Have you had an ACT team in last 12 months?  No  Describe: NA   Do you have any current or past legal issues?  No  Describe: NA   OK to leave a detailed voicemail?  Yes    Medical/ Surgical History                                   Patient Active Problem  List   Diagnosis     Obesity     Environmental allergies     Attention deficit hyperactivity disorder (ADHD)     Depression     Seasonal allergic rhinitis     Eczema     Moderate episode of recurrent major depressive disorder (H)     Bipolar I disorder, most recent episode depressed (H)          Medications             Current Outpatient Medications   Medication Sig Dispense Refill     hydrOXYzine (ATARAX) 25 MG tablet Take 1 tablet (25 mg) by mouth every 6 hours as needed for anxiety May take 50 mg at bedtime (Patient not taking: Reported on 4/20/2020) 60 tablet 3     loratadine (CLARITIN) 10 MG tablet Take 10 mg by mouth       QUEtiapine (SEROQUEL) 50 MG tablet Take 1 tablet (50 mg) by mouth At Bedtime 30 tablet 1         DISPOSITION      5/13/20 Intake complete. Adding to resident/NP PIPER.   Francisca Martinez,

## 2020-05-19 NOTE — PROGRESS NOTES
"Clinic Care Coordination Contact    Follow Up Progress Note      Assessment: Patient is enrolled in ongoing [facility] for support and coaching in accessing mental health services.     Goals addressed this encounter:   Goals Addressed                 This Visit's Progress      1. Mental Health Management (pt-stated)   50%     Goal Statement: I will establish with outpatient mental health providers within three months.   Date Goal set: 4/21/20  Barriers: Financial  Strengths: Motivated, has therapist now  Date to Achieve By: 8/1/20  Patient expressed understanding of goal: Yes    Action steps to achieve this goal:  1. I will continue working with my therapist.  2. I will schedule intake with  mental health services for psychiatry.   3. I will call [facility]  line for any additional services I may need and work with YIFAN CC for ongoing support.      As of today's date 5/19/2020 goal is met at 26 - 50%.   Goal Status:  Active- had [facility] psych intake, need appt              Intervention/Education provided during outreach: Goal, [facility] mental health resources/contact info     YIFAN CC outreach to patient to check in. Patient reported she completed her [facility] psych intake, SW CC confirmed via chart review. The staff person told her it may be up to a 5-6 month wait for psychiatry. Patient was told to \"ask Dr for anything sooner.\" SW CC advised patient to call  one access mental health line ([phone]). The staff there may have a way to refer outside of [facility] if needed. Otherwise, call YIFAN CC back for other options. Patient agreeable to plan.      Outreach Frequency: Monthly    Plan: SW CC will follow up in two weeks for check in on status of psych appt. Patient will call  one access mental health line to see about earlier appts/outside [facility] resources.     WILLOW Rivera   Social Work Clinic Care Coordinator   [facility]   PH: [phone]  [email or web address]   "

## 2020-06-02 ENCOUNTER — MYC MEDICAL ADVICE (OUTPATIENT)
Dept: CARE COORDINATION | Facility: CLINIC | Age: 20
End: 2020-06-02

## 2020-06-02 ENCOUNTER — PATIENT OUTREACH (OUTPATIENT)
Dept: CARE COORDINATION | Facility: CLINIC | Age: 20
End: 2020-06-02

## 2020-06-02 NOTE — PROGRESS NOTES
Clinic Care Coordination Contact    Follow Up Progress Note      Assessment: Patient is enrolled in ongoing [facility] for support and coaching in accessing mental health services.     Goals addressed this encounter:   Goals Addressed                 This Visit's Progress      1. Mental Health Management (pt-stated)   50%     Goal Statement: I will establish with outpatient mental health providers within three months.   Date Goal set: 4/21/20  Barriers: Financial  Strengths: Motivated, has therapist now  Date to Achieve By: 8/1/20  Patient expressed understanding of goal: Yes    Action steps to achieve this goal:  1. I will continue working with my therapist.  2. I will schedule intake with  mental health services for psychiatry.   3. I will call [facility] line for any additional services I may need and work with SW CC for ongoing support.      As of today's date 5/19/2020 goal is met at 26 - 50%.   Goal Status:  Active- had [facility] psych intake, need appt   As of today's date 6/2/2020 goal is met at 26 - 50%.   Goal Status:  Active- had [facility] psych intake, need appt              Intervention/Education provided during outreach: SW CC outreach to patient for check in. SW CC inquired if patient has any update on scheduling a psychiatry appt. Patient has not scheduled and has not reached out ot [facility] one access line yet since last SW CC call.     Patient recently activated [employer].  CC will send psychiatry options to patient via [employer] to review as well.      Outreach Frequency: Monthly    Care Coordinator has reviewed patient's Social Determinants of Health (SDoH) on this date. Upon review, changes were not made. SW CC sent patient SDoH questionnaire via [employer] to update.     Plan: YIFAN CC will follow up in one month for goal check in. Patient will schedule psychiatry appt ASAP.     WILLOW Rivera   Social Work Clinic Care Coordinator   [facility]   PH: [phone]   [email or web address]

## 2020-06-02 NOTE — LETTER
Psychiatry options:     - [facility] access for mental health: [phone]   o Ask for outside [facility] providers if they have sooner appt options     [email or web address]   - [place]: [address], [address], [place] [address]  o Phone: [phone]  - [place]: [address], [address], [place] [address]  o Phone: [phone]  - [place]: [address], [address], [place] [address]  o Phone: [phone]    [email or web address]   - [address] [place] [address]  - Phone: [phone]  - Other locations: [email or web address]     [email or web address]  - [address], [address], [place] [address]  - Phone: [phone]

## 2020-06-09 ENCOUNTER — TELEPHONE (OUTPATIENT)
Dept: SCHEDULING | Facility: CLINIC | Age: 20
End: 2020-06-09

## 2020-06-09 SDOH — ECONOMIC STABILITY: INCOME INSECURITY: IN THE LAST 12 MONTHS, WAS THERE A TIME WHEN YOU WERE NOT ABLE TO PAY THE MORTGAGE OR RENT ON TIME?: YES

## 2020-06-09 NOTE — LETTER
[facility]  [address] [address]  Tel. [phone]  Fax [phone]    June 9, 2020    Nathaly Rodriguez  2000  [address] [facility] [address]      To Whom it May Concern:    Nathaly Rodriguez has been struggling with mental health problems for several years and  We have had to adjust her medications several times to try to help her as best we can.  Patients unfortunately often experience medication side effects or need to try several medications  And/or doses before the correct combination is found.   Counseling has been helpful but takes time to work.     I believe that Nathaly's schooling was affected by medication side effects as well as her underlying   Mental health conditions and not having her on the right combination/dose contributed to her   Not being able to complete her schoolwork/semester.     Adjustments have been made for the better. She is working hard to be well.     Please consider this appeal and allow her another chance at success.     For questions or concerns call the [facility] at [phone] (Peds).    Sincerely,        Muna Campbell MD

## 2020-06-09 NOTE — TELEPHONE ENCOUNTER
Reason for call:  Other   Patient called regarding (reason for call): call back    Additional comments: Patient requesting note from provider Shannan stating that patient was on the incorrect dosage of medications (per patient dosages were changed by another provider) and it affected her taking a class at school.    Patient is writing an appeal in regards to her class and is hoping to have this information for the appeal.     Per patient, medications were: Latuda, Zoloft, Adderall, and Hydroxyzine.    Phone number to reach patient:  Home number on file [phone] (home)    Best Time:  Any time    Can we leave a detailed message on this number?  YES    Travel screening: Not Applicable

## 2020-06-09 NOTE — TELEPHONE ENCOUNTER
Letter written> would be happy to edit if parents would like me to review any of the phrasing    Muna Campbell MD on 6/9/2020 at 4:58 PM

## 2020-06-15 DIAGNOSIS — G47.00 INSOMNIA, UNSPECIFIED TYPE: ICD-10-CM

## 2020-06-15 DIAGNOSIS — F30.8 HYPOMANIA (H): ICD-10-CM

## 2020-06-15 DIAGNOSIS — F31.30 BIPOLAR I DISORDER, MOST RECENT EPISODE DEPRESSED (H): ICD-10-CM

## 2020-06-16 RX ORDER — QUETIAPINE FUMARATE 50 MG/1
50 TABLET, FILM COATED ORAL AT BEDTIME
Qty: 30 TABLET | Refills: 0 | Status: SHIPPED | OUTPATIENT
Start: 2020-06-16 | End: 2020-09-29

## 2020-06-16 NOTE — TELEPHONE ENCOUNTER
Sagrario refill provided for one month - [facility] in [place] ( I had to guess the pharmacy).  Patient is due for medication follow up, please call to schedule follow up prior to next refill.  Video visit ok.  I know she has been working on getting with psychiatry, and that is still what we prefer, but if she cannot get in with thtem she should follow up with us.    Electronically signed by:  Daniela Cantu MD  Pediatrics  [facility]

## 2020-07-01 ENCOUNTER — PATIENT OUTREACH (OUTPATIENT)
Dept: CARE COORDINATION | Facility: CLINIC | Age: 20
End: 2020-07-01

## 2020-07-01 NOTE — LETTER
[place] CARE COORDINATION  [facility]  [address] [address]  Tel. [phone]  Fax [phone]    July 17, 2020    Nathaly Rodriguez  [address]  [facility] [address]      Dear Nathaly,    I have been unsuccessful in reaching you since our last contact. At this time the Care Coordination team will make no further attempts to reach you, however this does not change your ability to continue receiving care from your providers at your primary care clinic. If you need additional support from a care coordinator in the future please contact me at [phone].    All of us at [facility] are invested in your health and are here to assist you in meeting your goals.     Sincerely,    WILLOW Rivera   Social Work Clinic Care Coordinator   [facility]   PH: [phone]  [email or web address]

## 2020-07-01 NOTE — PROGRESS NOTES
Clinic Care Coordination Contact  [facility]/Voicemail    Clinical Data: Care Coordinator Outreach    Outreach attempted x 1.  Left message on patient's voicemail with call back information and requested return call. Inquired it pt has psychiatrist yet or if she needs help scheduling PCP appt due to recent gurdeep refill/need for appt for any more refills from .     Plan: Care Coordinator will try to reach patient again in 10 business days.    WILLOW Rivera   Social Work Clinic Care Coordinator   [facility]   PH: [phone]  [email or web address]

## 2020-07-17 ENCOUNTER — MYC MEDICAL ADVICE (OUTPATIENT)
Dept: CARE COORDINATION | Facility: CLINIC | Age: 20
End: 2020-07-17

## 2020-07-17 NOTE — PROGRESS NOTES
Clinic Care Coordination Contact  [facility]/Voicemail    Clinical Data: Care Coordinator Outreach    Outreach attempted x 2.  Left message on patient's voicemail with call back information and requested return call.    Pt dis-enrolled from [facility] due to no contact.     Plan: Care Coordinator will send unable to contact letter with care coordinator contact information via [employer]. Care Coordinator will do no further outreaches at this time.    WILLOW Rivera   Social Work Clinic Care Coordinator   [facility]   PH: [phone]  [email or web address]

## 2020-09-28 ENCOUNTER — TELEPHONE (OUTPATIENT)
Dept: PEDIATRICS | Facility: CLINIC | Age: 20
End: 2020-09-28

## 2020-09-28 DIAGNOSIS — F31.30 BIPOLAR I DISORDER, MOST RECENT EPISODE DEPRESSED (H): ICD-10-CM

## 2020-09-28 DIAGNOSIS — F30.8 HYPOMANIA (H): ICD-10-CM

## 2020-09-28 DIAGNOSIS — G47.00 INSOMNIA, UNSPECIFIED TYPE: ICD-10-CM

## 2020-09-29 RX ORDER — QUETIAPINE FUMARATE 50 MG/1
50 TABLET, FILM COATED ORAL AT BEDTIME
Qty: 30 TABLET | Refills: 0 | Status: SHIPPED | OUTPATIENT
Start: 2020-09-29 | End: 2020-10-12

## 2020-09-29 NOTE — TELEPHONE ENCOUNTER
"VV scheduled with Dr. Campbell on: 10/12/2020 2:20 PM .    Spoke to pt, she says she has not seen psychiatry, she says she contacted them in the summer and there were \"all these waitlists.\"  Advised her to call the [facility], and at least try to get scheduled/on the books - even if it maybe months in advance. She has the # and says she will call to schedule.      \" one access for mental health: [phone]   ? Ask for soonest [facility] appt or they can connect you to outside  providers if they have sooner appt options.\"  " [facility] called looking for verbal orders for skilled nursing for about 9 weeks. 2 times next week and 1 time per week for 7 weeks.    Candi with [facility] can be reached at the following number:  [phone]

## 2020-09-29 NOTE — TELEPHONE ENCOUNTER
Not sure what happened to 8/17 message ? Never routed????    I provided one gurdeep refill on seroquel but patient absolutely needs f/u appointment, 40 minutes- can be virtual- has she been able to contact psychiatry? See July outreach messages      Muna Campbell MD on 9/29/2020 at 12:46 PM

## 2020-10-12 ENCOUNTER — TELEPHONE (OUTPATIENT)
Dept: PEDIATRICS | Facility: CLINIC | Age: 20
End: 2020-10-12

## 2020-10-12 ENCOUNTER — VIRTUAL VISIT (OUTPATIENT)
Dept: PEDIATRICS | Facility: CLINIC | Age: 20
End: 2020-10-12
Payer: COMMERCIAL

## 2020-10-12 DIAGNOSIS — F31.30 BIPOLAR I DISORDER, MOST RECENT EPISODE DEPRESSED (H): ICD-10-CM

## 2020-10-12 DIAGNOSIS — F31.30 BIPOLAR I DISORDER, MOST RECENT EPISODE DEPRESSED (H): Primary | ICD-10-CM

## 2020-10-12 DIAGNOSIS — F30.8 HYPOMANIA (H): ICD-10-CM

## 2020-10-12 DIAGNOSIS — G47.00 INSOMNIA, UNSPECIFIED TYPE: ICD-10-CM

## 2020-10-12 DIAGNOSIS — E66.01 MORBID OBESITY (H): ICD-10-CM

## 2020-10-12 PROCEDURE — 99214 OFFICE O/P EST MOD 30 MIN: CPT | Mod: 95 | Performed by: PEDIATRICS

## 2020-10-12 RX ORDER — QUETIAPINE FUMARATE 50 MG/1
50 TABLET, FILM COATED ORAL AT BEDTIME
Qty: 90 TABLET | Refills: 1 | Status: SHIPPED | OUTPATIENT
Start: 2020-10-12 | End: 2020-10-12

## 2020-10-12 RX ORDER — QUETIAPINE FUMARATE 50 MG/1
50 TABLET, EXTENDED RELEASE ORAL AT BEDTIME
Qty: 30 TABLET | Refills: 0 | Status: SHIPPED | OUTPATIENT
Start: 2020-10-12 | End: 2020-12-03

## 2020-10-12 RX ORDER — QUETIAPINE FUMARATE 100 MG/1
100 TABLET, FILM COATED ORAL AT BEDTIME
Qty: 30 TABLET | Refills: 3 | Status: SHIPPED | OUTPATIENT
Start: 2020-10-12 | End: 2020-11-17

## 2020-10-12 ASSESSMENT — ANXIETY QUESTIONNAIRES
GAD7 TOTAL SCORE: 18
3. WORRYING TOO MUCH ABOUT DIFFERENT THINGS: NEARLY EVERY DAY
IF YOU CHECKED OFF ANY PROBLEMS ON THIS QUESTIONNAIRE, HOW DIFFICULT HAVE THESE PROBLEMS MADE IT FOR YOU TO DO YOUR WORK, TAKE CARE OF THINGS AT HOME, OR GET ALONG WITH OTHER PEOPLE: EXTREMELY DIFFICULT
5. BEING SO RESTLESS THAT IT IS HARD TO SIT STILL: SEVERAL DAYS
1. FEELING NERVOUS, ANXIOUS, OR ON EDGE: NEARLY EVERY DAY
7. FEELING AFRAID AS IF SOMETHING AWFUL MIGHT HAPPEN: NEARLY EVERY DAY
2. NOT BEING ABLE TO STOP OR CONTROL WORRYING: NEARLY EVERY DAY
6. BECOMING EASILY ANNOYED OR IRRITABLE: NEARLY EVERY DAY

## 2020-10-12 ASSESSMENT — PATIENT HEALTH QUESTIONNAIRE - PHQ9
5. POOR APPETITE OR OVEREATING: MORE THAN HALF THE DAYS
SUM OF ALL RESPONSES TO PHQ QUESTIONS 1-9: 14

## 2020-10-12 NOTE — TELEPHONE ENCOUNTER
See today's office visit for background. Discussed patient's case with psychiatry hotline.  Reviewed most pressing concerns and med history. Given that patient has found some relief with 50 mg of seroquel short acting, Dr. Sinha recommended increasing that to 100 mg of short -acting at bedtime and adding long-acting seroquel as well starting at 50 mg daily and consider increasing the long-acting med gradually to 150 mg .    Called patient with this recommendation. She is willing to try it. Will contact us if any concerns. She was excited because she was able to secure a psych intake appointment for later late October/Early November      Muna Campbell MD on 10/12/2020 at 5:16 PM

## 2020-10-12 NOTE — PROGRESS NOTES
"Nathaly Rodriguez is a 20 year old female who is being evaluated via a billable video visit.      The patient has been notified of following:     \"This video visit will be conducted via a call between you and your physician/provider. We have found that certain health care needs can be provided without the need for an in-person physical exam.  This service lets us provide the care you need with a video conversation.  If a prescription is necessary we can send it directly to your pharmacy.  If lab work is needed we can place an order for that and you can then stop by our lab to have the test done at a later time.    Video visits are billed at different rates depending on your insurance coverage.  Please reach out to your insurance provider with any questions.    If during the course of the call the physician/provider feels a video visit is not appropriate, you will not be charged for this service.\"    Patient has given verbal consent for Video visit? Yes  How would you like to obtain your AVS? MyChart  If you are dropped from the video visit, the video invite should be resent to: Text to cell phone: [phone]  Will anyone else be joining your video visit? No    Subjective     Nathaly Rodriguez is a 20 year old female who presents today via video visit for the following health issues:    HPI       Mental Health Follow-up Visit for Depression and anxiety    How is your mood today? good    Change in symptoms since last visit: same    New symptoms since last visit:  none    Problems taking medications: No    Who else is on your mental health care team? 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 5-6 days a week  Because her thoughts won't let her rest  Not always sleepy anymore  Sometimes almost dozes off at work but UP when she gets home    overanalysis and constant selfdoubt have her friendship  I don't know how to stop the things I don't want to do  Dredging up old bad thoughts  Fighting herself constantly    Can't ever just be relaxed even watching TV  Like a different movie is playing in her head  Can't follow the story because her mind is on a different track  Accidentally slows down when driving because she's distracted     Depression Response    Patient completed the PHQ-9 assessment for depression and scored >9? Yes  Question 9 on the PHQ-9 was positive for suicidality? No  Does patient have current mental health provider? Yes    Is this a virtual visit? Yes   Does patient have suicidal ideation (positive question 9)? No - offer to place Mental Health Referral.  Referral order placed       CALL [facility] HOTLINE UNTIL SHE CAN GET IN TO PSYCHIATRY     In the past two weeks have you had thoughts of suicide or self-harm?  No.    Do you have concerns about your personal safety or the safety of others?   No    Home and School     Have there been any big changes at home? No    Are you having challenges at school?   No  Social Supports:     Friend(s) a friend , her cats , her therapist  Sleep:    Hours of sleep on a school night: </=7 hours (associated with increased risk of depression within 12 months)  Substance abuse:    None  Maladaptive coping strategies:    None  Other  Stressors: WAS ABLE TO GET HER LICENSE    Suicide Assessment Five-step Evaluation and Treatment (SAFE-T)       Video Start Time: 02:59 PM      Review of Systems   Constitutional, HEENT, cardiovascular, pulmonary, GI, , musculoskeletal, neuro, skin, endocrine and psych systems are negative, except as otherwise noted.      Objective           Vitals:  No vitals were obtained today due to virtual visit.    Physical Exam     GENERAL: Healthy, alert and no distress  EYES: Eyes grossly normal to inspection.  No discharge or erythema, or obvious scleral/conjunctival abnormalities.  RESP: No audible wheeze, cough, or visible cyanosis.  No visible retractions or increased work of breathing.    SKIN: Visible skin clear. No significant rash, abnormal pigmentation or lesions.  NEURO: Cranial nerves grossly intact.  Mentation and speech appropriate for age.  PSYCH: Mentation appears normal, affect normal/bright, judgement and insight intact, normal speech and appearance well-groomed.              ICD-10-CM    1. Bipolar I disorder, most recent episode depressed (H)  F31.30 QUEtiapine (SEROQUEL) 50 MG tablet     MENTAL HEALTH REFERRAL  - Adult; Psychiatry; Psychiatry; [facility]: Psychiatry Clinic [phone]; We will contact you to schedule the appointment or please call with any questions   2. Insomnia, unspecified type  G47.00 QUEtiapine (SEROQUEL) 50 MG tablet     MENTAL HEALTH REFERRAL  - Adult; Psychiatry; Psychiatry; [facility]: Psychiatry Clinic [phone]; We will contact you to schedule the appointment or please call with any questions   3. Hypomania (H)  F30.8 QUEtiapine (SEROQUEL) 50 MG tablet     MENTAL HEALTH REFERRAL  - Adult; Psychiatry; Psychiatry; [facility]: Psychiatry Clinic [phone]; We will contact you to schedule the appointment or please call with any questions   4. Morbid obesity (H)  E66.01      Will contact [facility] hotline to see what med adjustments we can do for her before she can get see in to adult psychiatry  in person. Unable to be seen last spring due to insurance problems- now has Val [facility]    See associated telephone call for additional time documentation for PAL consult.   5 p.m. appointment today      Video-Visit Details    Type of service:  Video Visit    Video End Time:3:21 PM    Originating Location (pt. Location): Home    Distant Location (provider location):  [facility]     Platform used for Video Visit: Larry Campbell MD on 10/12/2020 at 3:40 PM

## 2020-10-13 ASSESSMENT — ANXIETY QUESTIONNAIRES: GAD7 TOTAL SCORE: 18

## 2020-10-28 ENCOUNTER — VIRTUAL VISIT (OUTPATIENT)
Dept: PSYCHIATRY | Facility: CLINIC | Age: 20
End: 2020-10-28
Attending: PSYCHOLOGIST
Payer: COMMERCIAL

## 2020-10-28 DIAGNOSIS — F34.1 PERSISTENT DEPRESSIVE DISORDER: ICD-10-CM

## 2020-10-28 DIAGNOSIS — F12.20 SEVERE CANNABIS USE DISORDER (H): ICD-10-CM

## 2020-10-28 DIAGNOSIS — F43.10 PTSD (POST-TRAUMATIC STRESS DISORDER): Primary | ICD-10-CM

## 2020-10-28 DIAGNOSIS — F41.1 GAD (GENERALIZED ANXIETY DISORDER): ICD-10-CM

## 2020-10-28 PROCEDURE — 90791 PSYCH DIAGNOSTIC EVALUATION: CPT | Mod: HN
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
## 2020-10-28 NOTE — LETTER
November 4, 2020      Nathaly S Jennifer  [address]  [facility] [address]        Dear ,    We are writing to inform you of your test results.    {results letter list:[id number]}    No results found from the In Basket message.    If you have any questions or concerns, please call the clinic at the number listed above.       Sincerely,        Lori Harvey

## 2020-11-04 ENCOUNTER — VIRTUAL VISIT (OUTPATIENT)
Dept: PSYCHIATRY | Facility: CLINIC | Age: 20
End: 2020-11-04
Attending: NURSE PRACTITIONER
Payer: COMMERCIAL

## 2020-11-04 DIAGNOSIS — F12.20 SEVERE CANNABIS USE DISORDER (H): ICD-10-CM

## 2020-11-04 DIAGNOSIS — F43.10 PTSD (POST-TRAUMATIC STRESS DISORDER): Primary | ICD-10-CM

## 2020-11-04 PROCEDURE — 99203 OFFICE O/P NEW LOW 30 MIN: CPT | Mod: 95 | Performed by: NURSE PRACTITIONER

## 2020-11-04 ASSESSMENT — PAIN SCALES - GENERAL: PAINLEVEL: NO PAIN (0)

## 2020-11-04 NOTE — PATIENT INSTRUCTIONS
Thank you for coming to the [facility] MENTAL HEALTH & ADDICTION [place] CLINIC.    Lab Testing:  If you had lab testing today and your results are reassuring or normal they will be mailed to you or sent through [employer] within 7 days. If the lab tests need quick action we will call you with the results. The phone number we will call with results is # [phone] (home) . If this is not the best number please call our clinic and change the number.    Medication Refills:  If you need any refills please call your pharmacy and they will contact us. Our fax number for refills is [phone]. Please allow three business for refill processing. If you need to  your refill at a new pharmacy, please contact the new pharmacy directly. The new pharmacy will help you get your medications transferred.     Scheduling:  If you have any concerns about today's visit or wish to schedule another appointment please call our office during normal business hours [phone] (8-5:00 M-F)    Contact Us:  Please call [phone] during business hours (8-5:00 M-F).  If after clinic hours, or on the weekend, please call  [phone].    Financial Assistance [phone]  [facility] Billing [phone]  Central Billing Office, [facility]: [phone]  [place] Billing [phone]  Medical Records [phone]  [place] Patient Bill of Rights [email or web address]       MENTAL HEALTH CRISIS NUMBERS:  For a medical emergency please call  911 or go to the nearest ER.     [facility]:   [facility] -[phone]   Crisis Residence [facility] Page Residence -[phone]   Walk-In Counseling Center \[facility] [place] [facility]\"" -[phone]   COPE 24/7 [place] Mobile Team -[phone] (adults)/[phone] (child)  CHILD: [facility] needs assessment team - [phone]      [facility]:   [facility] - [phone]   Walk-in counseling [facility]  [facility] - [phone]   Walk-in counseling [facility] - [phone]   Crisis Residence [facility] Residence - [phone]  Urgent Care Adult Mental Gtgpjc-[phone] mobile unit/ 24/7 crisis line    National Crisis Numbers:   National Suicide Prevention Lifeline: 9-469-777-TALK (862-691-1306)  Poison Control Center - 1-915.367.6161  [employer] [facility]/resources for a list of additional resources (SOS)  Trans Lifeline a hotline for transgender people 5-220-256-2208  The [employer] a hotline for LGBT youth 1-985.423.4502  Crisis Text Line: For any crisis 24/7   To: 405705  see www.crisistextline.org  - IF MAKING A CALL FEELS TOO HARD, send a text!         Again thank you for choosing [facility] MENTAL HEALTH & ADDICTION [facility] and please let us know how we can best partner with you to improve you and your family's health.    You may be receiving a survey regarding this appointment. We would love to have your feedback, both positive and negative. The survey is done by an external company, so your answers are anonymous.
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
## 2020-11-16 DIAGNOSIS — F31.30 BIPOLAR I DISORDER, MOST RECENT EPISODE DEPRESSED (H): ICD-10-CM

## 2020-11-16 DIAGNOSIS — G47.00 INSOMNIA, UNSPECIFIED TYPE: ICD-10-CM

## 2020-11-16 DIAGNOSIS — F30.8 HYPOMANIA (H): ICD-10-CM

## 2020-11-17 RX ORDER — QUETIAPINE FUMARATE 100 MG/1
100 TABLET, FILM COATED ORAL AT BEDTIME
Qty: 30 TABLET | Refills: 3 | Status: SHIPPED | OUTPATIENT
Start: 2020-11-17

## 2020-11-23 ENCOUNTER — TELEPHONE (OUTPATIENT)
Dept: PSYCHIATRY | Facility: CLINIC | Age: 20
End: 2020-11-23

## 2020-11-23 NOTE — TELEPHONE ENCOUNTER
No answer to writer or clinic, patient didn't log on either platform. Left VM requesting call back to reschedule.

## 2020-11-23 NOTE — TELEPHONE ENCOUNTER
On November 23, 2020, at 9:34 AM, writer called patient at [phone] to confirm Virtual Visit. Writer unable to make contact with patient. Writer left detailed voice message for call back. [phone] left as call back number. Kym Foley, [facility]

## 2020-11-25 NOTE — TELEPHONE ENCOUNTER
Nathaly called back to say she will  tomorrow  (6/10)   Rifampin Pregnancy And Lactation Text: This medication is Pregnancy Category C and it isn't know if it is safe during pregnancy. It is also excreted in breast milk and should not be used if you are breast feeding.

## 2020-11-29 ENCOUNTER — HEALTH MAINTENANCE LETTER (OUTPATIENT)
Age: 20
End: 2020-11-29

## 2020-12-03 DIAGNOSIS — F31.30 BIPOLAR I DISORDER, MOST RECENT EPISODE DEPRESSED (H): ICD-10-CM

## 2020-12-03 DIAGNOSIS — F30.8 HYPOMANIA (H): ICD-10-CM

## 2020-12-03 DIAGNOSIS — G47.00 INSOMNIA, UNSPECIFIED TYPE: ICD-10-CM

## 2020-12-03 RX ORDER — QUETIAPINE FUMARATE 50 MG/1
50 TABLET, EXTENDED RELEASE ORAL AT BEDTIME
Qty: 30 TABLET | Refills: 0 | Status: SHIPPED | OUTPATIENT
Start: 2020-12-03 | End: 2020-12-16

## 2020-12-03 NOTE — TELEPHONE ENCOUNTER
Routing refill request to provider for review/approval because:  Labs not current:  CBC    Isaura GERBERN, RN, PHN

## 2020-12-03 NOTE — TELEPHONE ENCOUNTER
Reason for Call:  Medication or medication refill:    Do you use a [facility]?  Name of the pharmacy and phone number for the current request:     [id number] - [place], [place] - [address]      Name of the medication requested: QUEtiapine ER (SEROQUEL XR) 50 MG TB24 24 hr tablet       Other request: The patient called and stated that she needs this medication filled. She is almost out so she will need it filled as soon as possible.     Can we leave a detailed message on this number? YES    Phone number patient can be reached at: Cell number on file:    Telephone Information:   Mobile [phone]       Best Time: Any    Call taken on 12/3/2020 at 1:47 PM by Marilee Soria

## 2020-12-16 ENCOUNTER — VIRTUAL VISIT (OUTPATIENT)
Dept: PSYCHIATRY | Facility: CLINIC | Age: 20
End: 2020-12-16
Attending: NURSE PRACTITIONER
Payer: COMMERCIAL

## 2020-12-16 DIAGNOSIS — F43.10 PTSD (POST-TRAUMATIC STRESS DISORDER): Primary | ICD-10-CM

## 2020-12-16 DIAGNOSIS — F34.1 PERSISTENT DEPRESSIVE DISORDER: ICD-10-CM

## 2020-12-16 PROCEDURE — 99214 OFFICE O/P EST MOD 30 MIN: CPT | Mod: 95 | Performed by: NURSE PRACTITIONER

## 2020-12-16 RX ORDER — QUETIAPINE FUMARATE 25 MG/1
25 TABLET, FILM COATED ORAL DAILY
Qty: 30 TABLET | Refills: 0 | Status: SHIPPED | OUTPATIENT
Start: 2020-12-16 | End: 2021-01-20

## 2020-12-16 ASSESSMENT — PAIN SCALES - GENERAL: PAINLEVEL: NO PAIN (0)

## 2020-12-16 NOTE — PROGRESS NOTES
"VIDEO VISIT  Nathaly Rodriguez is a 20 year old patient who is being evaluated via a billable video visit.      The patient has been notified of following:   \"This video visit will be conducted via a call between you and your physician/provider. We have found that certain health care needs can be provided without the need for an in-person physical exam. This service lets us provide the care you need with a video conversation. If a prescription is necessary we can send it directly to your pharmacy. If lab work is needed we can place an order for that and you can then stop by our lab to have the test done at a later time. Insurers are generally covering virtual visits as they would in-office visits so billing should not be different than normal.  If for some reason you do get billed incorrectly, you should contact the billing office to correct it and that number is in the AVS .    Video Conference to be completed via:  Dafiti    Patient has given verbal consent for video visit?:  Yes    Patient would prefer that any video invitations be sent by: Send to e-mail at: [email or web address]      How would patient like to obtain AVS?:  iCreate Software    AVS SmartPhrase [PsychAVS] has been placed in 'Patient Instructions':  Yes     Video- Visit Details  Type of service:  video visit for medication management  Time of service:    Date:  12/16/2020    Video Start Time:  10:09 AM        Video End Time: 10:46a    Reason for video visit:  Patient has requested telehealth visit  Originating Site (patient location):  [facility]   Location- Patient's home  Distant Site (provider location):  Remote location  Mode of Communication:  Video Conference via AmAbcodia  Consent:  Patient has given verbal consent for video visit?: Yes     Switched to Doxy when patient could not log in Dafiti.       [facility]  Psychiatry Clinic  PSYCHIATRIC PROGRESS NOTE       Nathaly Rodriguez is a 20 year old female who pronouns her name " "[place]-ahh and pronouns she, her, hers, herself.  Therapist: biweekly with Monae Abdalla at [facility]  PCP: Muna Campbell  Other Providers: monthly with ILS worker ([facility]), ANA MARÍA Castro at [facility]    PREVIOUS PSYCH MED TRIALS:  - Adderall XR 10-30mg (initially effective)  - Prozac 10-40mg (trialed as a teen, felt angry but this trial was while she was with her MA)  - Latuda 40mg (worsening mood and fear, shaking at night increased after taking dinner time dose; this was added to Zoloft)  - Metadate ER 20mg (2015 trial, limited efficacy)  - Ritalin 20-40mg  - sertraline 25-50mg (\"terrible\", worsening mood, notes she felt \"weird\" with Zoloft as monotherapy and worse with Latuda)    Pertinent Background:  See previous notes.  Psych critical item history includes suicidal ideation, trauma hx, psych hosp (<3), SUBSTANCE USE: cannabis and referral for day treatment, monitoring limiting intake and possible emotional eating.     Interim History     [4, 4]     The patient is a good historian, reports adequate treatment adherence. She was last seen 11/04/2020 when she chose to continue Seroquel IR 100mg QAM with Seroquel XR 50mg at bed.     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  - worries for her nearly 10yo brother  - prefers staying on top of her homework and bills, enjoys seeing friends with precautions  - she submitted an application for rent relief  - hosting a Omari pajama party  - support from her therapist  - no need to call [facility] since 11/2019    Recent Symptoms:   Depression:  sadness, depressed mood, insomnia, " "appetite changes, jeffy concentration, feelings of worthless and fleeting passive SI without plan or intention     Elevated:  persistent vs episodic irritability; ongoing cannabis use; denies decreased sleep need, grandiosity, increased goal directed behavior  - hypomania in Nov 2019 (achieving her \"best\" grades, increased irritability, fighting with others and all while taking Zoloft and Latuda)  - reports some impulsivity in being around other people who may not make safe decisions    Anxiety:  nervous, worry for her brother, her education, her finances  Trauma Related:  intrusive memories, avoidance, trauma memory loss, negative beliefs / emotions, hypervigilance, mood dysregulation and interrupted sleep    APPETITE: recently low; history of urges to limit intake, possible emotional eating vs binging  SLEEP: sleeps in 2-4 hour intervals     Recent Substance Use:  Alcohol- rarely drinks, drink socially if she drinks   Tobacco- smoking 1-2 CBD/ hemp cigarettes a day   Caffeine- infrequent coffee, likes sparkling water and Iced tea, drinks Coke or Sprite 1-3x week  Opioids- friends offer her opiates, she declines    Cannabis- smokes daily, uses a $40-60/ week cartridge, $10 for a green form of bud (she sees this as safest to use, but gone in a day), or $40 one gm wax (hits hard, lasts longest) or $40 one gm dab as she can afford   Other Illicit Drugs-experimented with cocaine- (smoked with cannabis in a bowl) and none since Sept 2020, codeine, \"yop\" or estacy, none in the last year        Social/ Family History      [1ea,1ea]            [per patient report]                 FINANCIAL SUPPORT- voucher through [employer], college student, working part time at [employer]  CHILDREN- None       LIVING SITUATION- lives in her own [place] apartment      LEGAL- None    EARLY HISTORY/ EDUCATION- born and raised by her Mom but removed from her house in 2010. Lived between foster homes/ SOS/ her MA from [phone] until getting her own " place through [facility]. One brother born in 2010. Graduated high school in 2018. 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    SOCIAL/ SPIRITUAL SUPPORT- support from her therapist and [facility]; might identify as Confucianism Syn[employer]        CULTURAL INFLUENCES/ IMPACT- none       TRAUMA HISTORY- extensive, reported episodes from her Mom  FEELS SAFE AT HOME- Yes  FAMILY HISTORY-  Brother- she perceives depression, Mom- BPAD depression, addiction    Medical / Surgical History                                 Patient Active Problem List   Diagnosis     Obesity     Environmental allergies     Attention deficit hyperactivity disorder (ADHD)     Depression     Seasonal allergic rhinitis     Eczema     Moderate episode of recurrent major depressive disorder (H)     Bipolar I disorder, most recent episode depressed (H)       No past surgical history on file.     Medical Review of Systems         [2,10]     A comprehensive review of systems was performed and is negative other than noted in the HPI.    Allergy    Seasonal allergies and Food  Current Medications        Current Outpatient Medications   Medication Sig Dispense Refill     loratadine (CLARITIN) 10 MG tablet Take 10 mg by mouth       QUEtiapine (SEROQUEL XR) 50 MG TB24 24 hr tablet Take 1 tablet (50 mg) by mouth At Bedtime 30 tablet 0     QUEtiapine (SEROQUEL) 100 MG tablet Take 1 tablet (100 mg) by mouth At Bedtime 30 tablet 3     hydrOXYzine (ATARAX) 25 MG tablet Take 1 tablet (25 mg) by mouth every 6 hours as needed for anxiety May take 50 mg at bedtime (Patient not taking: Reported on 4/20/2020) 60 tablet 3     Vitals         [3, 3]   There were no vitals taken for this visit.   Mental Status Exam        [9, 14 cog gs]     Alertness: alert  and oriented  Appearance: adequately groomed  Behavior/Demeanor: cooperative, pleasant and calm, with good  eye contact   Speech: normal and regular rate and rhythm  Language: no  problems  Psychomotor: normal or unremarkable  Mood: depressed, anxious and worried  Affect: appropriate; was congruent to mood; was congruent to content  Thought Process/Associations: unremarkable  Thought Content:  Reports none;  Denies suicidal ideation, violent ideation, delusions, preoccupations, obsessions , phobia , magical thinking, over-valued ideas and paranoid ideation  Perception:  Reports none;  Denies auditory hallucinations, visual hallucinations, visual distortion seen as shadows , depersonalization and derealization  Insight: good and fair  Judgment: adequate for safety  Cognition: (6) does  appear grossly intact; formal cognitive testing was not done  Gait/Station and/or Muscle Strength/Tone: n/a    Labs and Data                          Rating Scales:    N/A    PHQ9 Today:    PHQ 8/23/2019 2/19/2020 10/12/2020   PHQ-9 Total Score 20 26 14   Q9: Thoughts of better off dead/self-harm past 2 weeks Not at all Nearly every day Not at all     ANTIPSYCHOTIC LABS    Recent Labs   Lab Test 12/06/19  1249 11/01/13  1334 05/21/13  1845   GLC 87  --  91   A1C 5.3 5.6 5.6     Recent Labs   Lab Test 12/06/19  1249 05/21/13  1845   CHOL 175* 137   TRIG 91* 128   * 68   HDL 47 44*     Recent Labs   Lab Test 12/06/19  1249 05/21/13  1845   AST 17 28   ALT 29 23   ALKPHOS 98 121     Recent Labs   Lab Test 11/01/13  1334 05/21/13  1845   HGB 12.5 12.8     Diagnosis      Impression includes complex PTSD, cannabis use disorder  - monitoring Dysthymia, BRITTA      Assessment      [m2, h3]     TODAY, the following items were discussed:     [place] Prescription Monitoring Program [] was checked today:  indicates Adderall ER 10mg #30 last filled 6/4/2020 by Muna Campbell.    PSYCHOTROPIC DRUG INTERACTIONS:   - QUETIAPINE -- HYDROXYZINE may result in increased risk of QT-interval prolongation.     Drug Interaction Management: Monitoring for adverse effects, routine vitals, routine labs, periodic EKGs, using lowest  therapeutic dose of [psychotropics] and patient is aware of risks    Plan                                                                                                                     m2, h3     1) discussed options, risks, benefits including psychotropics with substances, today, she chooses to trial reducing morning dose from XR 50mg to Seroquel IR 25mg each morning, continue 100mg at bed    2) THERAPY: biweekly with Monae, she's thinking about bring a list of 3 things she wants to talk about    3) OTHER COMPONENTS:  Monitoring labs, EKG (none in chart)  - will request Tamie join next visit     RTC: 4 weeks, sooner as needed    CRISI-S NUMBERS:   Provided routinely in AVS.    Treatment Risk Statement:  The patient understands the risks, benefits, adverse effects and alternatives. Agrees to treatment with the capacity to do so. No medical contraindications to treatment. Agrees to call clinic for any problems. The patient understands to call 911 or go to the nearest ED if life threatening or urgent symptoms occur.     WHODAS 2.0  TODAY total score = N/A; [a 12-item WHODAS 2.0 assessment was not completed by the pt today and/or recorded in EPIC].    PROVIDER:  GLENN Malcolm CNP

## 2020-12-16 NOTE — PATIENT INSTRUCTIONS
Thank you for coming to the [facility] MENTAL HEALTH & ADDICTION [place] [facility].    Lab Testing:  If you had lab testing today and your results are reassuring or normal they will be mailed to you or sent through [employer] within 7 days. If the lab tests need quick action we will call you with the results. The phone number we will call with results is # [phone] (home) . If this is not the best number please call our clinic and change the number.    Medication Refills:  If you need any refills please call your pharmacy and they will contact us. Our fax number for refills is [phone]. Please allow three business for refill processing. If you need to  your refill at a new pharmacy, please contact the new pharmacy directly. The new pharmacy will help you get your medications transferred.     Scheduling:  If you have any concerns about today's visit or wish to schedule another appointment please call our office during normal business hours [phone] (8-5:00 M-F)    Contact Us:  Please call [phone] during business hours (8-5:00 M-F).  If after clinic hours, or on the weekend, please call  [phone].    Financial Assistance [phone]  [facility] Billing [phone]  Central Billing Office, [facility]: [phone]  [facility] Billing [phone]  Medical Records [phone]  [facility] Patient Bill of Rights [email or web address]       MENTAL HEALTH CRISIS NUMBERS:  For a medical emergency please call  911 or go to the nearest ER.     [facility]:   [facility] -[phone]   Crisis Residence [facility] -[phone]   Walk-In Counseling Center [facility] -[phone]   COPE 24/7 [place] Mobile Team -[phone] (adults)/[phone] (child)  CHILD: [facility] needs assessment team - [phone]      [facility]:   [facility] - [phone]   Walk-in counseling [facility]  [facility] - [phone]   Walk-in counseling [facility] - [phone]   Crisis Residence [facility] - [phone]  Urgent Care Adult Mental V[facility]-[phone] mobile unit/ 24/7 crisis line    National Crisis Numbers:   National Suicide Prevention Lifeline: 3-577-798-TALK (323-262-8379)  Poison Control Center - 1-873.801.4012  [employer]/resources for a list of additional resources (SOS)  Trans Lifeline a hotline for transgender people 4-350-733-8380  [employer] a hotline for LGBT youth 1-443.982.2971  Crisis Text Line: For any crisis 24/7   To: 335757  see www.crisistextline.org  - IF MAKING A CALL FEELS TOO HARD, send a text!         Again thank you for choosing [facility] MENTAL HEALTH & ADDICTION [facility] [place] and please let us know how we can best partner with you to improve you and your family's health.    You may be receiving a survey regarding this appointment. We would love to have your feedback, both positive and negative. The survey is done by an external company, so your answers are anonymous.

## 2021-01-15 ENCOUNTER — TELEPHONE (OUTPATIENT)
Dept: PSYCHIATRY | Facility: CLINIC | Age: 21
End: 2021-01-15

## 2021-01-15 NOTE — TELEPHONE ENCOUNTER
On January 15, 2021, at 10:02 AM, writer called patient at [phone] to confirm Virtual Visit. Writer unable to make contact with patient. Writer left detailed voice message for call back. [phone] left as call back number. Kym Foley, [facility]

## 2021-01-19 DIAGNOSIS — F34.1 PERSISTENT DEPRESSIVE DISORDER: ICD-10-CM

## 2021-01-19 DIAGNOSIS — F43.10 PTSD (POST-TRAUMATIC STRESS DISORDER): ICD-10-CM

## 2021-01-20 RX ORDER — QUETIAPINE FUMARATE 25 MG/1
25 TABLET, FILM COATED ORAL DAILY
Qty: 30 TABLET | Refills: 0 | Status: SHIPPED | OUTPATIENT
Start: 2021-01-20 | End: 2021-02-25

## 2021-01-20 NOTE — TELEPHONE ENCOUNTER
QUEtiapine (SEROQUEL) 25 MG  Last refilled: 12/16/20  Qty: 30    Last seen: 12/16/20  RTC: 4 WEEKS  Cancel: 0  No-show: 1/15  Next appt: NONE  Scheduling has been notified to contact the pt for appointment.  Refill pended and routed to the provider for review/determination due to Pt outside of RTC timeframe  WITH NO SHOW X 1

## 2021-02-24 DIAGNOSIS — F34.1 PERSISTENT DEPRESSIVE DISORDER: ICD-10-CM

## 2021-02-24 DIAGNOSIS — F43.10 PTSD (POST-TRAUMATIC STRESS DISORDER): ICD-10-CM

## 2021-02-25 RX ORDER — QUETIAPINE FUMARATE 25 MG/1
25 TABLET, FILM COATED ORAL DAILY
Qty: 30 TABLET | Refills: 0 | Status: SHIPPED | OUTPATIENT
Start: 2021-02-25

## 2021-02-25 NOTE — TELEPHONE ENCOUNTER
Medication requested: QUEtiapine (SEROQUEL) 25 MG tablet  Last refilled: 1/20/21  Qty: 30      Last seen: 12/16/20  RTC: 4 weeks  Cancel: 0  No-show: 1  Next appt: 0    Refill decision:   Refill pended and routed to the provider for review/determination due to   NO SHOW X 1  Scheduling has been notified to contact the pt for appointment.

## 2021-09-25 ENCOUNTER — HEALTH MAINTENANCE LETTER (OUTPATIENT)
Age: 21
End: 2021-09-25

## 2021-10-29 ENCOUNTER — TELEPHONE (OUTPATIENT)
Dept: PEDIATRICS | Facility: CLINIC | Age: 21
End: 2021-10-29

## 2021-10-29 NOTE — TELEPHONE ENCOUNTER
I see patient has an appointment coming up next week and she wants to discuss her ADHD medications. However, because of her complicated history (bipolar) she was managed more recently by psychiatry, which I still feel is probably the most appropriate speciality for her for that.     I would like to help her reconnect with psychiatry . Is she still able to schedule with her psychiatry nurse practitioner?     Happy to discuss but her safety it is best to stick with ONE prescriber for her controlled substances. Happy to see her still at that appointment and address her overall health and other concerns, just giving her a heads up about that       Muna Campbell MD on 10/29/2021 at 1:23 PM

## 2021-10-29 NOTE — TELEPHONE ENCOUNTER
Dr. Campbell,     Spoke with Nathaly. Last time she meet with psychiatry was around March 2021, patient has not been taking her Seroquel every day, but reports doing okay with her mental health but she is running low.     I gave her the below message, she will keep her appointment with you, and she will call today to schedule an appointment with her psychiatrists.     Debbie Ramos RN     Requested medication(s) are due for refill today: Yes  Patient has already received a courtesy refill: No  Other reason request has been forwarded to provider:

## 2022-01-15 ENCOUNTER — HEALTH MAINTENANCE LETTER (OUTPATIENT)
Age: 22
End: 2022-01-15

## 2022-03-08 ENCOUNTER — TELEPHONE (OUTPATIENT)
Dept: PEDIATRICS | Facility: CLINIC | Age: 22
End: 2022-03-08

## 2022-03-21 NOTE — TELEPHONE ENCOUNTER
Called patient to inform her that the letter is ready for . Patient stated that one has been mailed to her already.

## 2022-12-26 ENCOUNTER — HEALTH MAINTENANCE LETTER (OUTPATIENT)
Age: 22
End: 2022-12-26

## 2023-04-16 ENCOUNTER — HEALTH MAINTENANCE LETTER (OUTPATIENT)
Age: 23
End: 2023-04-16

## 2024-06-23 ENCOUNTER — HEALTH MAINTENANCE LETTER (OUTPATIENT)
Age: 24
End: 2024-06-23